# Patient Record
Sex: MALE | Race: WHITE | Employment: OTHER | URBAN - METROPOLITAN AREA
[De-identification: names, ages, dates, MRNs, and addresses within clinical notes are randomized per-mention and may not be internally consistent; named-entity substitution may affect disease eponyms.]

---

## 2017-02-13 ENCOUNTER — TELEPHONE (OUTPATIENT)
Dept: CARDIOTHORACIC SURGERY | Age: 72
End: 2017-02-13

## 2017-02-22 ENCOUNTER — HOSPITAL ENCOUNTER (OUTPATIENT)
Dept: CT IMAGING | Age: 72
Discharge: HOME | End: 2017-02-22
Attending: SURGERY | Admitting: SURGERY

## 2017-02-22 DIAGNOSIS — I71.40 AAA (ABDOMINAL AORTIC ANEURYSM) WITHOUT RUPTURE: ICD-10-CM

## 2017-02-22 DIAGNOSIS — I71.40 ABDOMINAL AORTIC ANEURYSM WITHOUT RUPTURE: ICD-10-CM

## 2017-03-02 ENCOUNTER — TELEPHONE (OUTPATIENT)
Dept: CARDIOTHORACIC SURGERY | Age: 72
End: 2017-03-02

## 2017-03-02 LAB
CREATININE, URINE: NORMAL
HBA1C MFR BLD: 6.1 %
MICROALBUMIN/CREAT 24H UR: 0 MG/G{CREAT}
MICROALBUMIN/CREAT UR-RTO: NORMAL

## 2017-03-08 ENCOUNTER — OFFICE VISIT (OUTPATIENT)
Dept: NEUROLOGY | Age: 72
End: 2017-03-08

## 2017-03-08 VITALS
DIASTOLIC BLOOD PRESSURE: 82 MMHG | WEIGHT: 256 LBS | BODY MASS INDEX: 34.67 KG/M2 | HEART RATE: 64 BPM | OXYGEN SATURATION: 94 % | SYSTOLIC BLOOD PRESSURE: 134 MMHG | HEIGHT: 72 IN

## 2017-03-08 DIAGNOSIS — F32.A DEPRESSION, UNSPECIFIED DEPRESSION TYPE: ICD-10-CM

## 2017-03-08 DIAGNOSIS — I10 ESSENTIAL HYPERTENSION: ICD-10-CM

## 2017-03-08 DIAGNOSIS — G25.0 ESSENTIAL TREMOR: Primary | ICD-10-CM

## 2017-03-08 DIAGNOSIS — I48.20 CHRONIC ATRIAL FIBRILLATION (HCC): ICD-10-CM

## 2017-03-08 DIAGNOSIS — E11.9 CONTROLLED TYPE 2 DIABETES MELLITUS WITHOUT COMPLICATION, WITHOUT LONG-TERM CURRENT USE OF INSULIN (HCC): ICD-10-CM

## 2017-03-08 DIAGNOSIS — G20 PD (PARKINSON'S DISEASE) (HCC): ICD-10-CM

## 2017-03-08 PROCEDURE — 99214 OFFICE O/P EST MOD 30 MIN: CPT | Performed by: PSYCHIATRY & NEUROLOGY

## 2017-09-13 ENCOUNTER — OFFICE VISIT (OUTPATIENT)
Dept: NEUROLOGY | Age: 72
End: 2017-09-13

## 2017-09-13 VITALS
OXYGEN SATURATION: 93 % | SYSTOLIC BLOOD PRESSURE: 128 MMHG | WEIGHT: 252 LBS | HEIGHT: 72 IN | BODY MASS INDEX: 34.13 KG/M2 | HEART RATE: 62 BPM | DIASTOLIC BLOOD PRESSURE: 87 MMHG

## 2017-09-13 DIAGNOSIS — I48.20 CHRONIC ATRIAL FIBRILLATION (HCC): ICD-10-CM

## 2017-09-13 DIAGNOSIS — E11.9 CONTROLLED TYPE 2 DIABETES MELLITUS WITHOUT COMPLICATION, WITHOUT LONG-TERM CURRENT USE OF INSULIN (HCC): ICD-10-CM

## 2017-09-13 DIAGNOSIS — G20 PD (PARKINSON'S DISEASE) (HCC): Primary | ICD-10-CM

## 2017-09-13 DIAGNOSIS — I10 ESSENTIAL HYPERTENSION: ICD-10-CM

## 2017-09-13 DIAGNOSIS — G25.0 ESSENTIAL TREMOR: ICD-10-CM

## 2017-09-13 PROCEDURE — 99214 OFFICE O/P EST MOD 30 MIN: CPT | Performed by: PSYCHIATRY & NEUROLOGY

## 2017-09-13 ASSESSMENT — ENCOUNTER SYMPTOMS
VOMITING: 0
NAUSEA: 0
CONSTIPATION: 0
BLURRED VISION: 0
RESPIRATORY NEGATIVE: 1

## 2017-10-12 DIAGNOSIS — G25.0 ESSENTIAL TREMOR: ICD-10-CM

## 2017-10-12 NOTE — TELEPHONE ENCOUNTER
Last seen 09.13.17    Last office note states to RTO 6 months/03.2018.     Next appointment scheduled for none yet

## 2018-03-06 ENCOUNTER — OFFICE VISIT (OUTPATIENT)
Dept: NEUROLOGY | Age: 73
End: 2018-03-06

## 2018-03-06 VITALS
SYSTOLIC BLOOD PRESSURE: 127 MMHG | HEIGHT: 72 IN | HEART RATE: 60 BPM | OXYGEN SATURATION: 96 % | BODY MASS INDEX: 33.32 KG/M2 | DIASTOLIC BLOOD PRESSURE: 76 MMHG | WEIGHT: 246 LBS

## 2018-03-06 DIAGNOSIS — G25.0 ESSENTIAL TREMOR: ICD-10-CM

## 2018-03-06 DIAGNOSIS — I48.20 CHRONIC ATRIAL FIBRILLATION (HCC): ICD-10-CM

## 2018-03-06 DIAGNOSIS — F51.01 PRIMARY INSOMNIA: ICD-10-CM

## 2018-03-06 DIAGNOSIS — I10 ESSENTIAL HYPERTENSION: ICD-10-CM

## 2018-03-06 DIAGNOSIS — G20 PD (PARKINSON'S DISEASE) (HCC): Primary | ICD-10-CM

## 2018-03-06 DIAGNOSIS — E11.9 CONTROLLED TYPE 2 DIABETES MELLITUS WITHOUT COMPLICATION, WITHOUT LONG-TERM CURRENT USE OF INSULIN (HCC): ICD-10-CM

## 2018-03-06 DIAGNOSIS — F32.A DEPRESSION, UNSPECIFIED DEPRESSION TYPE: ICD-10-CM

## 2018-03-06 PROCEDURE — 99214 OFFICE O/P EST MOD 30 MIN: CPT | Performed by: PSYCHIATRY & NEUROLOGY

## 2018-03-06 RX ORDER — DONEPEZIL HYDROCHLORIDE 10 MG/1
10 TABLET, FILM COATED ORAL
COMMUNITY
Start: 2018-01-19

## 2018-03-06 ASSESSMENT — ENCOUNTER SYMPTOMS
BLURRED VISION: 0
VOMITING: 0
NAUSEA: 0
CONSTIPATION: 0
EYES NEGATIVE: 1
RESPIRATORY NEGATIVE: 1

## 2018-03-06 NOTE — PROGRESS NOTES
The patient came today for follow up regarding: possible PD. Since the patient's last visit, he increased his Sinemet  from 4-6 times per day. He denies any SE from the medicine except with mild dizziness. He continues to have daily tremors in his right arm. Symptoms are worse with  rest, stress or fine motor skills. Duration is minutes and frequency is daily activity three times a day. Degree can be severe. No other associated symptom. He walks with a slow gait but no falling. He feels unsteady. He denies any dysphagia or dysarthria. He denies any visual disturbance or headaches. No worsening of his depression. No psychosis or hallucination. He is not very active at home. He has occasional memory impairment and short-term memory loss. She can be forgetful and needs prompt from his wife. He was started on Aricept recently 10 mg daily. He has occasional insomnia but no sleep apnea or other parasomnia. He feels tired throughout the day. No severe EDS. History of A. fib on Coumadin. He is on blood pressure medications. He takes statin every night. He takes Zoloft for chronic depression. No other new symptoms today. Other review of system was unremarkable. Past Medical History:   Diagnosis Date    Abdominal aortic aneurysm without rupture ([address] 2015    Atrial fibrillation ([address]     Depression 12/2013    Diabetes mellitus ([address]     [place] (hard of hearing)     Hx of blood clots     pe, post valve repair    Hyperlipidemia     Hypertension     Kidney stone     Urologist Dr. Frances David Platelets decreased ([address]     Pulmonary embolism ([address] 3/2008    S/P MVR    Seasonal allergies     Tremors of nervous system     right hand and leg (has seen a neurologist in past for this )     Prior to Visit Medications    Medication Sig Taking?  Authorizing Provider   donepezil (ARICEPT) 10 MG tablet Take 10 mg by mouth Yes Historical Provider, MD carbidopa-levodopa (SINEMET)  MG per tablet TAKE ONE TABLET BY MOUTH THREE TIMES A DAY Yes Jerome Shaw MD   furosemide (LASIX) 40 MG tablet Take 40 mg by mouth daily Yes Historical Provider, MD   lovastatin (MEVACOR) 20 MG tablet TAKE ONE TABLET BY MOUTH DAILY Yes Peter Norris,    sertraline (ZOLOFT) 50 MG tablet TAKE ONE TABLET BY MOUTH DAILY Yes Peter Norris DO   warfarin (COUMADIN) 1 MG tablet TAKE ONE TABLET BY MOUTH DAILY Yes Peter Norris DO   warfarin (COUMADIN) 5 MG tablet Take 5 mg by mouth See Admin Instructions Takes 5 mg daily in the evening last dose to be 10-1-15 Yes Historical Provider, MD   GLIPIZIDE XL 5 MG CR tablet TAKE ONE TABLET BY MOUTH EVERY DAY Yes Peter Norris,    Multiple Vitamins-Minerals (MULTIVITAMIN PO)   Take 1 tablet by mouth daily  Yes Historical Provider, MD   Cholecalciferol (VITAMIN D3) 2000 UNITS CAPS   Take 1 capsule by mouth daily  Yes Historical Provider, MD   metoprolol (LOPRESSOR) 50 MG tablet Take 0.5 tablets by mouth 2 times daily. Yes Charo Castrejon, DO     Allergies   Allergen Reactions    Other Other (See Comments)     Environmental \"gold lesli\" causes post nasal drip with occas.  Non-productive cough    Pcn [Penicillins] Hives     Social History   Substance Use Topics    Smoking status: Former Smoker     Packs/day: 2.50     Years: 9.00     Types: Cigarettes     Start date: 1/1/1965     Quit date: 1/31/1974    Smokeless tobacco: Never Used      Comment: maintain cessation    Alcohol use No     Family History   Problem Relation Age of Onset    Heart Disease Mother     High Blood Pressure Mother     Heart Disease Father     High Blood Pressure Father     Heart Disease Brother     Heart Disease Maternal Grandmother     High Blood Pressure Maternal Grandmother     Heart Disease Maternal Grandfather     High Blood Pressure Maternal Grandfather     Heart Disease Paternal Grandmother     High Blood Pressure Paternal Grandmother     Heart gait.  Poor arm swinging and right arm tremor. Review of Systems   Constitutional: Negative for chills, fever and weight loss. HENT: Positive for hearing loss. Eyes: Negative. Negative for blurred vision. Respiratory: Negative. Cardiovascular: Negative. Gastrointestinal: Negative for constipation, nausea and vomiting. Genitourinary: Negative. Musculoskeletal: Negative for falls and joint pain. Skin: Negative. Negative for rash. Neurological: Positive for dizziness and tremors. Negative for focal weakness, loss of consciousness and headaches. Endo/Heme/Allergies: Negative. Psychiatric/Behavioral: Positive for depression and memory loss. Negative for suicidal ideas. The patient has insomnia. Medical decision making:  I personally reviewed social history, past medical history, medications, allergy, surgical history, and family history as documented in the patient's electronic health records. Labs and/or neuroimaging and other test results reviewed and discussed with the patient. Reviewed notes from other physicians. Provided patient education regarding risk, benefits and treatment options as well as adherence to medication regimen and side effect from these medications. Assessment:  Chronic tremors. Possibly idiopathic Parkinson disease. Chronic A. fib on Coumadin  Diabetes  Hypertension  Depression  Insomnia    Plan:  Increase Sinemet gradually every week to reach a Sinemet 25100 one tab five times daily. Side effect was discussed with the patient  Refill for medication  Hydration  Falling precaution  Encourage weekly exercise and activity  Long discussion with the patient and wife regarding prevention of dizziness, falling and adequate hydration.   Improving sleep hygiene was discussed in details  Continue Coumadin and follow INR  Blood sugar monitor and diet adjustment  Continue statin and monitor lipid panel  Continue home blood pressure medications  Continue Zoloft for depression  Follow-up with me in six month        Parkinson's Disease Medical and Surgical Treatment options reviewed: Treatment options for Parkinson Disease reviewed as appropriate with patient including but not limited to non-pharmacological treatment, pharmacological treatment and surgical treatment.

## 2018-04-12 LAB
AVERAGE GLUCOSE: NORMAL
HBA1C MFR BLD: 5.9 %

## 2018-05-07 DIAGNOSIS — G25.0 ESSENTIAL TREMOR: ICD-10-CM
